# Patient Record
Sex: FEMALE | Race: WHITE
[De-identification: names, ages, dates, MRNs, and addresses within clinical notes are randomized per-mention and may not be internally consistent; named-entity substitution may affect disease eponyms.]

---

## 2017-03-12 ENCOUNTER — HOSPITAL ENCOUNTER (EMERGENCY)
Dept: HOSPITAL 45 - C.EDB | Age: 22
Discharge: HOME | End: 2017-03-12
Payer: COMMERCIAL

## 2017-03-12 VITALS
WEIGHT: 133.82 LBS | BODY MASS INDEX: 21 KG/M2 | HEIGHT: 67.01 IN | BODY MASS INDEX: 21 KG/M2 | HEIGHT: 67.01 IN | WEIGHT: 133.82 LBS

## 2017-03-12 VITALS — HEART RATE: 66 BPM | OXYGEN SATURATION: 98 % | DIASTOLIC BLOOD PRESSURE: 74 MMHG | SYSTOLIC BLOOD PRESSURE: 114 MMHG

## 2017-03-12 VITALS — OXYGEN SATURATION: 100 %

## 2017-03-12 VITALS — TEMPERATURE: 98.6 F

## 2017-03-12 DIAGNOSIS — R06.02: ICD-10-CM

## 2017-03-12 DIAGNOSIS — R51: ICD-10-CM

## 2017-03-12 DIAGNOSIS — R07.2: Primary | ICD-10-CM

## 2017-03-12 LAB
ALP SERPL-CCNC: 68 U/L (ref 45–117)
ALT SERPL-CCNC: 39 U/L (ref 12–78)
ANION GAP SERPL CALC-SCNC: 11 MMOL/L (ref 3–11)
AST SERPL-CCNC: 33 U/L (ref 15–37)
BASOPHILS # BLD: 0.01 K/UL (ref 0–0.2)
BASOPHILS NFR BLD: 0.3 %
BUN SERPL-MCNC: 11 MG/DL (ref 7–18)
BUN/CREAT SERPL: 18.1 (ref 10–20)
CALCIUM SERPL-MCNC: 9.4 MG/DL (ref 8.5–10.1)
CHLORIDE SERPL-SCNC: 105 MMOL/L (ref 98–107)
CO2 SERPL-SCNC: 26 MMOL/L (ref 21–32)
COMPLETE: YES
CREAT CL PREDICTED SERPL C-G-VRATE: 145.7 ML/MIN
CREAT SERPL-MCNC: 0.59 MG/DL (ref 0.6–1.2)
EOSINOPHIL NFR BLD AUTO: 133 K/UL (ref 130–400)
GLUCOSE SERPL-MCNC: 87 MG/DL (ref 70–99)
HCT VFR BLD CALC: 39.2 % (ref 37–47)
IG%: 0 %
IMM GRANULOCYTES NFR BLD AUTO: 29.8 %
LYMPHOCYTES # BLD: 0.92 K/UL (ref 1.2–3.4)
MCH RBC QN AUTO: 31 PG (ref 25–34)
MCHC RBC AUTO-ENTMCNC: 36 G/DL (ref 32–36)
MCV RBC AUTO: 86.2 FL (ref 80–100)
MONOCYTES NFR BLD: 11 %
NEUTROPHILS # BLD AUTO: 1.6 %
NEUTROPHILS NFR BLD AUTO: 57.3 %
PMV BLD AUTO: 10.2 FL (ref 7.4–10.4)
POINT OF CARE TROPONIN I: 0 NG/ML (ref 0–0.04)
POTASSIUM SERPL-SCNC: 3.5 MMOL/L (ref 3.5–5.1)
PREG INTERNAL NEGATIVE QC: (no result)
PREG INTERNAL POSITIVE QC: (no result)
RBC # BLD AUTO: 4.55 M/UL (ref 4.2–5.4)
SODIUM SERPL-SCNC: 142 MMOL/L (ref 136–145)
WBC # BLD AUTO: 3.09 K/UL (ref 4.8–10.8)

## 2017-03-12 NOTE — DIAGNOSTIC IMAGING REPORT
CT ANGIOGRAM OF THE CHEST



CLINICAL HISTORY: Atypical chest pain.



COMPARISON STUDY:  No priors.



TECHNIQUE: Following the IV administration of 93 cc of Optiray 320, CT angiogram

of the chest was performed from the upper abdomen to the thoracic inlet

utilizing the pulmonary embolus protocol. Images are reviewed in the axial,

sagittal, and coronal planes. 3-D MIPS images are created and assessed. IV

contrast was administered without complication. Note that interpretation is

suboptimal without plain film correlate.



CT DOSE: 217.32 mGy.cm



FINDINGS:



Thyroid: Imaged portions of the thyroid gland are normal in size and

attenuation.



Thoracic aorta: The thoracic aorta is normal in caliber and demonstrates

standard 3-vessel arch anatomy. No dissection is seen.



Pulmonary vasculature: The pulmonary trunk is normal in caliber. There are no

filling defects identified in main, lobar, or segmental pulmonary branches to

suggest pulmonary embolus.



Heart: The heart is normal in size and configuration, and without pericardial

effusion.



Lungs and pleural spaces: A calcified granuloma is seen in the left upper lobe.

The lungs and pleural spaces are otherwise clear. The trachea and central

airways are patent.



Mediastinum: There is no mediastinal lymphadenopathy.



Kandace: Clear. There are calcified left hilar lymph nodes.



Axillae: There is no axillary lymphadenopathy.



Upper abdomen: Partially visualized upper abdominal viscera is within normal

limits.



Skeletal structures: No lytic or blastic bony lesions are seen.





IMPRESSION:



1. There is no evidence of pulmonary embolus in the main, lobar, or segmental

pulmonary arteries.



2. The lungs are clear.







Electronically signed by:  Keyon Oscar M.D.

3/12/2017 9:31 PM



Dictated Date/Time:  3/12/2017 9:27 PM

## 2017-03-12 NOTE — EMERGENCY ROOM VISIT NOTE
History


Report prepared by Raul:  Donald Pittman


Under the Supervision of:  Dr. Messi Sandoval M.D.


First contact with patient:  20:08


Chief Complaint:  CARDIAC ASSESSMENT


Stated Complaint:  HEADACHE,CHEST PAIN,REFERR[facility]





History of Present Illness


The patient is a 21 year old female who presents to the Emergency Room for a 

cardiac assessment. The patient was referred here by [facility]. She is 

experiencing a burning chest pain that began two and a half days ago. She rates 

her pain a 6/10 in severity. Her pain worsens with breathing and lying down. 

She flew to [place] for spring break. After a few days her symptoms began. [facility] 

[facility] and was sent here in case she had a blood clot in her 

lungs. She is experiencing some shortness of breath, headaches, and heat 

flashes. She denies any fevers, trauma, edema in her legs, and cough. She 

denies any past medical history, sick contacts, medical problems, and any birth 

control use.





   Source of History:  patient


   Onset:  2 and a half days ago


   Position:  chest


   Symptom Intensity:  6/10


   Quality:  burning


   Timing:  constant


   Modifying Factors (Worsening):  rest, breathing


   Associated Symptoms:  + SOB, + headache, No cough, No fevers


Note:


She is having heat flashes. 


She denies any leg edema and recent trauma.





Review of Systems


See HPI for pertinent positives & negatives. A total of 10 systems reviewed and 

were otherwise negative.





Past Medical & Surgical


Medical Problems:


(1) Migraine headache


(2) Temporomandibular disorder





Old medical records were reviewed. Nurse's notes were reviewed and I agree with.





Denies history of cardiac disease pulmonary disease or blood clots.





Family History





Patient reports no known family medical history.





Social History


Smoking Status:  Never Smoker


Smokeless Tobacco Use:  No


Alcohol Use:  none


Drug Use:  none


Marital Status:  single


Housing Status:  lives alone


Occupation Status:  [employer] student





Current/Historical Medications


Scheduled PRN


Acetaminophen (Tylenol), 500 MG PO UD PRN for Pain or Fever


Antacid (Antacid), 1 DOSE PO UD PRN for Indigestion





Allergies


Coded Allergies:  


     Doxycycline (Unverified  Allergy, Mild, DELIRIUM, 11/28/16)


     Sulfa Antibiotics (Unverified  Allergy, Mild, RASH, 11/28/16)





Physical Exam


Vital Signs











  Date Time  Temp Pulse Resp B/P Pulse Ox O2 Delivery O2 Flow Rate FiO2


 


3/12/17 21:52  66 18 114/74 98   


 


3/12/17 20:24  86      


 


3/12/17 20:18     100 Room Air  


 


3/12/17 20:04 37.0 65 16 135/76 99 Room Air  











Physical Exam


General: Non-ill appearing young female. Well developed well nourished in no 

acute distress, breathing comfortably on room air. Normal speech


HEENT: Normal cephalic atraumatic.  Pupils are equal round and reactive to 

light.  Sclera are anicteric.  Extraocular movements are intact.  Oropharynx is 

pink with moist mucous membranes.  No swelling of the mouth lips or tongue.


Neck: Supple with a midline trachea.  No meningeal signs or stiffness, no JVD 

or bruits. No Stridor.


Chest: Clear to auscultation bilaterally.  No wheezes or rhonchi.  No increased 

work of breathing. Mildly tender tender to palpation in the central region. 


Heart: regular rate and rhythm. 


Abdomen: Soft nontender, nondistended without rebound guarding or rigidity.  


Extremities: No cyanosis clubbing or edema. No calf tenderness or assymetry


Spine/Back. Non tender to palpation. No CVA tenderness


Skin: Good turgor without rashes.


Neurologic exam: Cranial nerves two through 12 are intact.  Motor and sensation 

are intact and symmetrical throughout.





Medical Decision & Procedures


ER Provider


Diagnostic Interpretation:


Radiology results as stated below per my review and radiologist interpretation:





CT ANGIOGRAM OF THE CHEST





CLINICAL HISTORY: Atypical chest pain.





COMPARISON STUDY:  No priors.





TECHNIQUE: Following the IV administration of 93 cc of Optiray 320, CT angiogram


of the chest was performed from the upper abdomen to the thoracic inlet


utilizing the pulmonary embolus protocol. Images are reviewed in the axial,


sagittal, and coronal planes. 3-D MIPS images are created and assessed. IV


contrast was administered without complication. Note that interpretation is


suboptimal without plain film correlate.





CT DOSE: 217.32 mGy.cm





FINDINGS:





Thyroid: Imaged portions of the thyroid gland are normal in size and


attenuation.





Thoracic aorta: The thoracic aorta is normal in caliber and demonstrates


standard 3-vessel arch anatomy. No dissection is seen.





Pulmonary vasculature: The pulmonary trunk is normal in caliber. There are no


filling defects identified in main, lobar, or segmental pulmonary branches to


suggest pulmonary embolus.





Heart: The heart is normal in size and configuration, and without pericardial


effusion.





Lungs and pleural spaces: A calcified granuloma is seen in the left upper lobe.


The lungs and pleural spaces are otherwise clear. The trachea and central


airways are patent.





Mediastinum: There is no mediastinal lymphadenopathy.





Kandace: Clear. There are calcified left hilar lymph nodes.





Axillae: There is no axillary lymphadenopathy.





Upper abdomen: Partially visualized upper abdominal viscera is within normal


limits.





Skeletal structures: No lytic or blastic bony lesions are seen.








IMPRESSION:





1. There is no evidence of pulmonary embolus in the main, lobar, or segmental


pulmonary arteries.





2. The lungs are clear.











Electronically signed by:  Keyon Oscar M.D.


3/12/2017 9:31 PM





Dictated Date/Time:  3/12/2017 9:27 PM








CHEST X-RAY





[facility]





Chest x-ray per my interpretation reveals no pneumothorax, failure, or 

infiltrate.





Laboratory Results


3/12/17 20:35








Red Blood Count 4.55, Mean Corpuscular Volume 86.2, Mean Corpuscular Hemoglobin 

31.0, Mean Corpuscular Hemoglobin Concent 36.0, Mean Platelet Volume 10.2, 

Neutrophils (%) (Auto) 57.3, Lymphocytes (%) (Auto) 29.8, Monocytes (%) (Auto) 

11.0, Eosinophils (%) (Auto) 1.6, Basophils (%) (Auto) 0.3, Neutrophils # (Auto

) 1.77, Lymphocytes # (Auto) 0.92, Monocytes # (Auto) 0.34, Eosinophils # (Auto

) 0.05, Basophils # (Auto) 0.01





3/12/17 20:35

















Test


  3/12/17


20:35 3/12/17


20:39


 


White Blood Count


  3.09 K/uL


(4.8-10.8) 


 


 


Red Blood Count


  4.55 M/uL


(4.2-5.4) 


 


 


Hemoglobin


  14.1 g/dL


(12.0-16.0) 


 


 


Hematocrit 39.2 % (37-47)  


 


Mean Corpuscular Volume


  86.2 fL


() 


 


 


Mean Corpuscular Hemoglobin


  31.0 pg


(25-34) 


 


 


Mean Corpuscular Hemoglobin


Concent 36.0 g/dl


(32-36) 


 


 


Platelet Count


  133 K/uL


(130-400) 


 


 


Mean Platelet Volume


  10.2 fL


(7.4-10.4) 


 


 


Neutrophils (%) (Auto) 57.3 %  


 


Lymphocytes (%) (Auto) 29.8 %  


 


Monocytes (%) (Auto) 11.0 %  


 


Eosinophils (%) (Auto) 1.6 %  


 


Basophils (%) (Auto) 0.3 %  


 


Neutrophils # (Auto)


  1.77 K/uL


(1.4-6.5) 


 


 


Lymphocytes # (Auto)


  0.92 K/uL


(1.2-3.4) 


 


 


Monocytes # (Auto)


  0.34 K/uL


(0.11-0.59) 


 


 


Eosinophils # (Auto)


  0.05 K/uL


(0-0.5) 


 


 


Basophils # (Auto)


  0.01 K/uL


(0-0.2) 


 


 


RDW Standard Deviation


  37.8 fL


(36.4-46.3) 


 


 


RDW Coefficient of Variation


  12.0 %


(11.5-14.5) 


 


 


Immature Granulocyte % (Auto) 0.0 %  


 


Immature Granulocyte # (Auto)


  0.00 K/uL


(0.00-0.02) 


 


 


Anion Gap


  11.0 mmol/L


(3-11) 


 


 


Est Creatinine Clear Calc


Drug Dose 145.7 ml/min 


  


 


 


Estimated GFR (


American) > 150.0 


  


 


 


Estimated GFR (Non-


American 131.9 


  


 


 


BUN/Creatinine Ratio 18.1 (10-20)  


 


Calcium Level


  9.4 mg/dl


(8.5-10.1) 


 


 


Total Bilirubin


  0.5 mg/dl


(0.2-1) 


 


 


Direct Bilirubin


  0.1 mg/dl


(0-0.2) 


 


 


Aspartate Amino Transf


(AST/SGOT) 33 U/L (15-37) 


  


 


 


Alanine Aminotransferase


(ALT/SGPT) 39 U/L (12-78) 


  


 


 


Alkaline Phosphatase


  68 U/L


() 


 


 


Total Protein


  7.8 gm/dl


(6.4-8.2) 


 


 


Albumin


  4.4 gm/dl


(3.4-5.0) 


 


 


Lipase


  148 U/L


() 


 


 


Human Chorionic Gonadotropin,


Qual NEG (NEG) 


  


 


 


Bedside D-Dimer


  


  > 450 ng/mlFEU


(0-450)


 


Bedside Troponin I


  


  0.000 ng/ml


(0-0.045)





Laboratory studies as stated above per my review.





Medications Administered











 Medications


  (Trade)  Dose


 Ordered  Sig/Meg


 Route  Start Time


 Stop Time Status Last Admin


Dose Admin


 


 Al Hydroxide/Mg


 Hydroxide


  (Maalox Susp)  30 ml  NOW  STAT


 PO  3/12/17 20:24


 3/12/17 20:26 DC 3/12/17 20:36


30 ML


 


 Lidocaine HCl


  (Viscous


 Lidocaine 2% Soln)  10 ml  NOW  STAT


 MT  3/12/17 20:24


 3/12/17 20:26 DC 3/12/17 20:36


10 ML











ECG


Indication:  chest pain


Rate (beats per minute):  68


Rhythm:  normal sinus


Findings:  no acute ischemic change, no ectopy


Comparison ECG Date:  [facility] today


Change:  no significant change


Change:


[facility] ECG findings: , no acute ischemic changes, no ectopy.





ED Course


2008: Past medical records reviewed. The patient was evaluated in room A11, and 

a complete history and physical examination were performed.





2024: Ordered Lidocaine HCl 10 ml MT, Maalox Susp 30 ml PO





2135: The patient is back from CT. She is doing well.





2150: Upon reevaluation, the patient is resting. I discussed the results and 

treatment plan with her. She verbalized agreement of the treatment plan. The 

patient was discharged home.





Medical Decision


Differentials include, but are not limited to; acute coronary syndrome, 

arrhythmia, pulmonary embolism, pneumothorax, musculoskeletal pain, and GERD.





This patient comes in as described above.  She was placed in room A 11.  She 

was sent over from "[facility] for further treatment and evaluation. I reviewed 

her chest x-ray and it is unremarkable.  EKG was unremarkable. I repeated EKG 

here and there is no evidence of acute coronary syndrome or arrhythmia.  Her 

symptoms do sound more GI. I gave her GI cocktail and she felt better.  She 

does have some pleurisy however.  Her d-dimer was elevated and in light of this

, I did a chest CT and there is no evidence of PE or other pulmonary or 

thoracic pathology.  She is not pregnant.  She has no acute electrode or 

metabolic abnormalities.  She feels good and would like to home. I will 

discharge him home.  She can use Zantac or Maalox over-the-counter antacids and 

return if: Worsening of symptoms, not tolerating fluids, fever or chills, any 

new problems or concerns.  She is happy with the plan and discharged to home





Impression





 Primary Impression:  


 Precordial chest pain





Scribe Attestation


The scribe's documentation has been prepared under my direction and personally 

reviewed by me in its entirety. I confirm that the note above accurately 

reflects all work, treatment, procedures, and medical decision making performed 

by me.





Departure Information


Dispostion


Home / Self-Care





Referrals


No Doctor, Assigned (PCP)





Forms


IMPORTANT VISIT INFORMATION





Patient Instructions


My [facility]





Additional Instructions





Rest.  Drink plenty of fluids.


May use Maalox or Zantac if needed





Return if worsening of symptoms, shortness of breath, fever chills, any new 

problems concerns.





Follow-up with the student clinic this week for recheck if not better or return 

to ER any point if symptoms worsen.

## 2018-02-05 ENCOUNTER — HOSPITAL ENCOUNTER (OUTPATIENT)
Dept: HOSPITAL 45 - C.GI | Age: 23
Discharge: HOME | End: 2018-02-05
Attending: INTERNAL MEDICINE
Payer: COMMERCIAL

## 2018-02-05 VITALS — HEART RATE: 48 BPM | SYSTOLIC BLOOD PRESSURE: 104 MMHG | DIASTOLIC BLOOD PRESSURE: 69 MMHG | OXYGEN SATURATION: 98 %

## 2018-02-05 VITALS
HEIGHT: 65.51 IN | BODY MASS INDEX: 22.27 KG/M2 | HEIGHT: 65.51 IN | BODY MASS INDEX: 22.27 KG/M2 | WEIGHT: 135.28 LBS | WEIGHT: 135.28 LBS

## 2018-02-05 VITALS — TEMPERATURE: 98.6 F

## 2018-02-05 DIAGNOSIS — K29.50: Primary | ICD-10-CM

## 2018-02-05 DIAGNOSIS — Z88.2: ICD-10-CM

## 2018-02-05 DIAGNOSIS — Z88.1: ICD-10-CM

## 2018-02-05 NOTE — GI REPORT
Procedure Date: 2/5/2018 11:14 AM

Procedure:            Upper GI endoscopy

Indications:          Dyspepsia, Heartburn, Suspected gastro-esophageal 

                      reflux disease

Medicines:            Monitored Anesthesia Care

Complications:        No immediate complications.

Estimated Blood Loss: Estimated blood loss: none.

Procedure:            Pre-Anesthesia Assessment:

                      - Prior to the procedure, a History and Physical was 

                      performed, and patient medications and allergies were 

                      reviewed. The patient is competent. The risks and 

                      benefits of the procedure and the sedation options and 

                      risks were discussed with the patient. All questions 

                      were answered and informed consent was obtained. 

                      Patient identification and proposed procedure were 

                      verified by the physician and the nurse in the 

                      procedure room. Mental Status Examination: alert and 

                      oriented. Airway Examination: normal oropharyngeal 

                      airway and neck mobility. Respiratory Examination: 

                      clear to auscultation. CV Examination: normal. ASA 

                      Grade Assessment: II - A patient with mild systemic 

                      disease. After reviewing the risks and benefits, the 

                      patient was deemed in satisfactory condition to undergo 

                      the procedure. The anesthesia plan was to use monitored 

                      anesthesia care (MAC). Immediately prior to 

                      administration of medications, the patient was 

                      re-assessed for adequacy to receive sedatives. The 

                      heart rate, respiratory rate, oxygen saturations, blood 

                      pressure, adequacy of pulmonary ventilation, and 

                      response to care were monitored throughout the 

                      procedure. The physical status of the patient was 

                      re-assessed after the procedure.

                      After obtaining informed consent, the endoscope was 

                      passed under direct vision. Throughout the procedure, 

                      the patient's blood pressure, pulse, and oxygen 

                      saturations were monitored continuously. The scope was 

                      introduced through the mouth, and advanced to the 

                      second part of duodenum. The upper GI endoscopy was 

                      accomplished without difficulty. The patient tolerated 

                      the procedure well.

Findings:

     The examined esophagus was normal. The BRAVO capsule with delivery 

     system was introduced through the mouth and advanced into the esophagus, 

     such that the BRAVO pH capsule was positioned 29 cm from the incisors, 

     which was 6 cm proximal to the GE junction. Suction was applied to the 

     well of the BRAVO pH capsule to suck in the adjacent mucosa of the 

     esophagus using the external vacuum pump set at a minimum vacuum 

     pressure of 580 mmHg for 30 seconds. The BRAVO pH capsule was then 

     deployed by depressing the plunger on top of the handle to advance the 

     locking pin into the mucosa, thereby attaching the capsule to the 

     esophagus. The plunger was then rotated a quarter turn clockwise to 

     release the capsule from the delivery system. The delivery system was 

     then withdrawn but the capsule did not deploy and remained attached to 

     the delivery system, a second capsule was calibrated again and did not 

     deploy also. Both failed capsules were retrieved. Endoscopy was utilized 

     for probe placement and diagnostic evaluation.

     The Z-line was regular and was found 35 cm from the incisors.

     The entire examined stomach was normal. Biopsies were taken with a cold 

     forceps for histology. Biopsies were taken with a cold forceps for 

     Helicobacter pylori testing. Verification of patient identification for 

     the specimen was done by the physician and nurse using the patient's 

     name and birth date.

     The duodenal bulb and second portion of the duodenum were normal. 

     Biopsies were taken with a cold forceps for histology.

Impression:           - Normal esophagus.

                      - Z-line regular, 35 cm from the incisors.

                      - Normal stomach. Biopsied.

                      - Normal duodenal bulb and second portion of the 

                      duodenum. Biopsied.

                      - The BRAVO pH capsule placement was not successful due 

                      to technical faliure in the deployment device.

Recommendation:       - Discharge patient to home.

                      - Await pathology results.

                      - Will schedule for repeat Bravo PH placement Vs. 

                      Ambulatroy 24hr PH test.

                      - Return to GI clinic as previously scheduled.

Alan Lawler MD

2/5/2018 12:28:43 PM

This report has been signed electronically.

Note Initiated On: 2/5/2018 11:14 AM

     I attest to the content of the Intraoperative Record and orders 

     documented therein, exceptions below

## 2018-02-05 NOTE — DISCHARGE INSTRUCTIONS
Endoscopy Patient Instructions


Date / Procedure(s) Performed


Feb 5, 2018.


EGD





Allergy Information


Coded Allergies:  


     Doxycycline (Verified  Allergy, Mild, DELIRIUM, 2/5/18)


     Sulfa Antibiotics (Verified  Allergy, Mild, RASH, 2/5/18)





Discharge Date / Findings


Feb 5, 2018.


Normal esophagus, stomach and duodenum.


Bravo PH could not be placed due to technical issues in deployment device.





Medication Instructions


Stopped Medication(s):  


took Ibuprofen yesterday





Provider Instructions





Activity Restrictions





-  No exercising or heavy lifting for 24 hours. 


-  Do not drink alcohol the day of the procedure.


-  Do not drive a car or operate machinery until the day after the procedure.


-  Do not make any important decisions or sign important papers in 24 hours 

after the procedure.





Following Day:





-  Return to full activity which may include returning to work/school.





Diet





Start your diet with liquids and light foods (jello, soup, juice, toast).  Then 

eat your usual diet if not nauseated.





Treatment For Common After Affects





For mild abdominal pain, bloating, or excessive gas:





-  Rest


-  Eat lightly


-  Lie on right side





Follow-Up Information


Follow-up with [facility] as scheduled





Anesthesia Information





What You Should Know





You have had a procedure that required some medicine to reduce anxiety and 

discomfort. This treatment is called moderate sedation.  


After receiving the treatment, you may be sleepy, but you will be able to 

breathe on your own.  The effects of the treatment may last for several hours.








Follow these instructions along with Activity/Diet recommendations noted above:





*  Do NOT do anything where dizziness or clumsiness would be dangerous.





*  Rest quietly at home today, then you can be up and about tomorrow.





*  Have a responsible person stay with you the rest of today.





*  You may have had an I.V. today.  If so, you may take the dressing off later 

today.





Recommendations


 


Call your doctor if:





*  Trouble breathing 





*  Continuous vomiting for more than 24 hours








*  Temperature above 101 degrees





*  Severe abdominal pain or bloating





*  Pain not relieved by pain medicine ordered





*  There is increased drainage or redness from any incision





*  A large amount of rectal bleeding greater than 2-3 tablespoons. 


   (If you had a polyp/s removed or have hemorrhoids, a small amount of blood -


    from the rectum is to be expected.)





*  You have any unanswered questions or concerns.








IN THE EVENT OF A SERIOUS EMERGENCY, GO TO THE NEAREST EMERGENCY ROOM








       Your discharge instructions were prepared by provider Alan Lawler.





 Patient Instructions Signature Page














Fatuma Octavio 











Patient (or Guardian) Signature/Date:____________________________________ I 

have read and understand the instructions given to me by my caregivers.








Caregiver/RN/Doctor Signature/Date:____________________________________











The above-named patient and/or guardian has received patient instructions on 

this date.





























+  Original Patient Signature Page (only) stays with chart.  Please make copy 

for patient.

## 2018-02-05 NOTE — ENDO HISTORY AND PHYSICAL
History & Physical


Date of Service:


Feb 5, 2018.


Chief Complaint:


GERD,epigastric pain


Referring Physician:


[facility]


History of Present Illness


Dyspepsia and reflux symptoms.





Past Surgical History


Hx Cardiac Surgery:  No


Hx Internal Defibrillator:  No


Hx Pacemaker:  No


Hx Abdominal Surgery:  No


Hx of Implantable Prosthesis:  No


Hx Post-Op Nausea and Vomiting:  No


Hx Cancer Surgery:  No


Hx Thoracic Surgery:  No


Hx Orthopedic:  No


Hx Urinary Tract Surgery:  No





Family History


None





Social History


Smoking Status:  Never Smoker


Hx Substance Use:  No


Hx Alcohol Use:  No





Allergies


Coded Allergies:  


     Doxycycline (Verified  Allergy, Mild, DELIRIUM, 2/5/18)


     Sulfa Antibiotics (Verified  Allergy, Mild, RASH, 2/5/18)





Current Medications





Reported Home Medications








 Medications  Dose


 Route/Sig


 Max Daily Dose Days Date Category


 


 Ibuprofen 200 Mg


 Tab  1-3 Tabs


 PO Q4H PRN


    2/2/18 Reported











Vital Signs


Weight (Kilograms):  61.36


Height (Feet):  5


Height (Inches):  5.5











  Date Time  Temp Pulse Resp B/P (MAP) Pulse Ox O2 Delivery O2 Flow Rate FiO2


 


2/5/18 09:28 37 66 16 109/64 (79) 99 Room Air  











Physical Exam


General Appearance:  no apparent distress


Respiratory/Chest:  


   Auscultation:  breath sounds normal


Cardiovascular:  


   Heart Auscultation:  RRR


Abdomen:  


   Inspection & Palpation:  soft, non-distended





Assessment and Plan


Stable for EGD with Bravo PH.

## 2018-02-05 NOTE — ANESTHESIOLOGY PROGRESS NOTE
Anesthesia Post Op Note


Date & Time


Feb 5, 2018 at 12:24





Vital Signs


Pain Intensity:  0





Vital Signs Past 12 Hours








  Date Time  Temp Pulse Resp B/P (MAP) Pulse Ox O2 Delivery O2 Flow Rate FiO2


 


2/5/18 09:28 37 66 16 109/64 (79) 99 Room Air  











Notes


Mental Status:  alert / awake / arousable, participated in evaluation


Pt Amnestic to Procedure:  Yes


Nausea / Vomiting:  adequately controlled


Pain:  adequately controlled


Airway Patency, RR, SpO2:  stable & adequate


BP & HR:  stable & adequate


Hydration State:  stable & adequate


Anesthetic Complications:  no major complications apparent


The patient is awake and stable.  The Bravo did not attach in the esophagus so 

was not placed during the procedure.